# Patient Record
Sex: MALE | Race: WHITE | NOT HISPANIC OR LATINO | Employment: OTHER | URBAN - METROPOLITAN AREA
[De-identification: names, ages, dates, MRNs, and addresses within clinical notes are randomized per-mention and may not be internally consistent; named-entity substitution may affect disease eponyms.]

---

## 2022-12-12 ENCOUNTER — APPOINTMENT (OUTPATIENT)
Dept: RADIOLOGY | Facility: CLINIC | Age: 82
End: 2022-12-12

## 2022-12-12 VITALS
RESPIRATION RATE: 18 BRPM | OXYGEN SATURATION: 98 % | DIASTOLIC BLOOD PRESSURE: 68 MMHG | HEART RATE: 70 BPM | WEIGHT: 204 LBS | BODY MASS INDEX: 32.02 KG/M2 | SYSTOLIC BLOOD PRESSURE: 112 MMHG | HEIGHT: 67 IN

## 2022-12-12 DIAGNOSIS — M25.571 RIGHT ANKLE PAIN, UNSPECIFIED CHRONICITY: ICD-10-CM

## 2022-12-12 DIAGNOSIS — M19.071 OSTEOARTHRITIS OF RIGHT ANKLE, UNSPECIFIED OSTEOARTHRITIS TYPE: Primary | ICD-10-CM

## 2022-12-12 RX ORDER — TRIAMCINOLONE ACETONIDE 40 MG/ML
40 INJECTION, SUSPENSION INTRA-ARTICULAR; INTRAMUSCULAR
Status: COMPLETED | OUTPATIENT
Start: 2022-12-12 | End: 2022-12-12

## 2022-12-12 RX ORDER — LIDOCAINE HYDROCHLORIDE 10 MG/ML
1 INJECTION, SOLUTION INFILTRATION; PERINEURAL
Status: COMPLETED | OUTPATIENT
Start: 2022-12-12 | End: 2022-12-12

## 2022-12-12 RX ADMIN — LIDOCAINE HYDROCHLORIDE 1 ML: 10 INJECTION, SOLUTION INFILTRATION; PERINEURAL at 12:11

## 2022-12-12 RX ADMIN — TRIAMCINOLONE ACETONIDE 40 MG: 40 INJECTION, SUSPENSION INTRA-ARTICULAR; INTRAMUSCULAR at 12:11

## 2022-12-12 NOTE — PROGRESS NOTES
Subjective:     Chief Complaint   Patient presents with   • Right Ankle - Clicking, Swelling, Pain, Numbness     Owen Mcconnell is a 78 y o  male complains of right ankle pain  Onset of the symptoms was several years  Mechanism of injury: none  Aggravating factors: walking , weight bearing and at rest  Treatment to date: corticosteroid injection which was somewhat effective and OTC analgesics which are not very effective  Symptoms have gradually worsened  Patient reports that he has been dealing with right ankle osteoarthritis for the past few years  Has seen several doctors to [address] [place] [address] and has received corticosteroid injections which provide him temporary relief  Most recent injection was performed 4 months ago  Additionally reports that he had the microfracture surgery which gave him temporary relief  Was advised that fusion was an option however patient was reluctant as there is no guarantee in pain improvement with procedure  The following portions of the patient's history were reviewed and updated as appropriate: allergies, current medications, past family history, past medical history, past social history, past surgical history and problem list        Review of Systems   Constitutional: Negative for fever  Musculoskeletal: positive for ankle pain and difficulty walking  Objective:  /68   Pulse 70   Resp 18   Ht 5' 7" (1 702 m)   Wt 92 5 kg (204 lb)   SpO2 98%   BMI 31 95 kg/m²      Skin: Venous stasis edema, no open wounds or erythema, no signs of infection  Neurologic: Neurologic exam is normal throughout lower extremities, Awake, alert, and oriented x3, no apparent distress  Musculoskeletal:  Inspection: Notable edema in the ankle and leg  Limited motion with ankle dorsiflexion and plantarflexion eversion and inversion  Notable crepitus with ankle motion  Pain in the tibiotalar joint               Imaging:     See final report      Assessment/Plan:  1  Osteoarthritis of right ankle, unspecified osteoarthritis type  - XR ankle 3+ vw right; Future        > 45 min devoted to review of previous, pertinent medical records, imaging, discussion of treatment options, counseling and documentation  Imaging independently reviewed and discussed with patient  Severe degenerative changes noted, no acute fractures appreciated  Follow-up official reading  We discussed the nature of ankle OA at length and detailed the treatment approach  Discussed role for CSI-given his mild relief with prior injection he would like to trial CSI in office  Peformed without complication  Directed to ice the area daily for 20 minutes at a time using a barrier to protect the skin- stressed specifically icing after activity to address inflammation  Recommending formal PT- patient declines after counseling  Discussed with patient that given his degree of arthritis in the ankle treatment options include surgical nonsurgical options  Advised that CSI's are appropriate for symptom management however may stop giving him relief given his degree of arthritis  In that situation I would recommend further consultation with podiatry to see if patient would be a candidate for possible surgical intervention  At this point patient would like to continue with conservative treatment options  Medium joint arthrocentesis: R ankle  Universal Protocol:  Consent: Verbal consent obtained  Risks and benefits: risks, benefits and alternatives were discussed  Consent given by: patient    Supporting Documentation  Indications: pain   Procedure Details  Location: ankle - R ankle  Needle size: 22 G  Ultrasound guidance: no  Approach: anteromedial  Medications administered: 1 mL lidocaine 1 %; 40 mg triamcinolone acetonide 40 mg/mL    Patient tolerance: patient tolerated the procedure well with no immediate complications    Risks and benefits of CSI were discussed with patient extensively   Risks were highlighted which included but were not limited to infection, pain, local site swelling, and chance that injection may not be effective  Patient was also counseled regarding glucose elevation days after receiving CSI and to be mindful of diet and check sugars daily  Patient agreeable to proceed with CSI after counseling

## 2023-01-09 ENCOUNTER — OFFICE VISIT (OUTPATIENT)
Dept: OBGYN CLINIC | Facility: CLINIC | Age: 83
End: 2023-01-09

## 2023-01-09 VITALS
HEART RATE: 86 BPM | WEIGHT: 205 LBS | SYSTOLIC BLOOD PRESSURE: 128 MMHG | RESPIRATION RATE: 18 BRPM | BODY MASS INDEX: 32.18 KG/M2 | DIASTOLIC BLOOD PRESSURE: 78 MMHG | OXYGEN SATURATION: 96 % | HEIGHT: 67 IN

## 2023-01-09 DIAGNOSIS — M19.071 OSTEOARTHRITIS OF RIGHT ANKLE, UNSPECIFIED OSTEOARTHRITIS TYPE: Primary | ICD-10-CM

## 2023-01-09 NOTE — PROGRESS NOTES
Subjective:     Chief Complaint   Patient presents with   • Right Ankle - Clicking, Swelling, Numbness, Tingling, Pain, Follow-up     Rodolfo Rojas is a 78 y o  male complains of right ankle pain  Onset of the symptoms was several years  Mechanism of injury: none  Aggravating factors: walking , weight bearing and at rest  Treatment to date: corticosteroid injection which was somewhat effective and OTC analgesics which are not very effective  Symptoms have gradually worsened  Patient presenting for follow-up visit in regards to right ankle pain  Patient was provided with corticosteroid injection into the right tibiotalar joint under ultrasound guidance  States that the injection did provide him relief for approximately 1 month however now has started to worsen in terms of pain severity  Is looking to have repeat injection in office today  Last injection was performed approximately 5 weeks ago    The following portions of the patient's history were reviewed and updated as appropriate: allergies, current medications, past family history, past medical history, past social history, past surgical history and problem list        Review of Systems   Constitutional: Negative for fever  Musculoskeletal: positive for ankle pain and difficulty walking  Objective:  /78   Pulse 86   Resp 18   Ht 5' 7" (1 702 m)   Wt 93 kg (205 lb)   SpO2 96%   BMI 32 11 kg/m²      Skin: Venous stasis edema, no open wounds or erythema, no signs of infection  Neurologic: Neurologic exam is normal throughout lower extremities, Awake, alert, and oriented x3, no apparent distress  Musculoskeletal:  Inspection: Notable edema in the ankle and leg  Limited motion with ankle dorsiflexion and plantarflexion eversion and inversion  Notable crepitus with ankle motion  Pain in the tibiotalar joint               Imaging:     See final report      Assessment/Plan:  1   Osteoarthritis of right ankle, unspecified osteoarthritis type    Patient has severe degree of osteoarthritis affecting the right ankle  Has received corticosteroid injections in the past from outside facility and I was able to perform ultrasound-guided steroid injection at last visit approximately 5 weeks ago which gave patient temporary relief  I did advise patient that at this time he would not be eligible for repeat injection as it is only been 5 weeks  I am recommending that given minimal pain relief with injection is next recommended step in terms of management would be consultation with podiatry to assess and see if alternative footwear versus surgical intervention may be indicated  Transportation seems to be an issue for patient so I advised that he can follow-up in [facility] which is closer to his residence  External referral for podiatry placed  Additionally patient seems to be hesitant on surgical intervention, I did advise that he may want to discuss possible pain management referral with primary care doctor or podiatry if surgery is not desired after consultation

## 2023-11-21 ENCOUNTER — OFFICE VISIT (OUTPATIENT)
Dept: OBGYN CLINIC | Facility: CLINIC | Age: 83
End: 2023-11-21
Payer: COMMERCIAL

## 2023-11-21 VITALS
HEART RATE: 79 BPM | RESPIRATION RATE: 18 BRPM | BODY MASS INDEX: 31.08 KG/M2 | SYSTOLIC BLOOD PRESSURE: 126 MMHG | WEIGHT: 198 LBS | HEIGHT: 67 IN | DIASTOLIC BLOOD PRESSURE: 72 MMHG | OXYGEN SATURATION: 96 %

## 2023-11-21 DIAGNOSIS — M19.071 OSTEOARTHRITIS OF RIGHT ANKLE, UNSPECIFIED OSTEOARTHRITIS TYPE: Primary | ICD-10-CM

## 2023-11-21 PROCEDURE — 99213 OFFICE O/P EST LOW 20 MIN: CPT | Performed by: FAMILY MEDICINE

## 2023-11-21 PROCEDURE — 20606 DRAIN/INJ JOINT/BURSA W/US: CPT | Performed by: FAMILY MEDICINE

## 2023-11-21 RX ORDER — TRIAMCINOLONE ACETONIDE 40 MG/ML
40 INJECTION, SUSPENSION INTRA-ARTICULAR; INTRAMUSCULAR
Status: COMPLETED | OUTPATIENT
Start: 2023-11-21 | End: 2023-11-21

## 2023-11-21 RX ORDER — BUPIVACAINE HYDROCHLORIDE 2.5 MG/ML
2 INJECTION, SOLUTION INFILTRATION; PERINEURAL
Status: COMPLETED | OUTPATIENT
Start: 2023-11-21 | End: 2023-11-21

## 2023-11-21 RX ADMIN — BUPIVACAINE HYDROCHLORIDE 2 ML: 2.5 INJECTION, SOLUTION INFILTRATION; PERINEURAL at 15:30

## 2023-11-21 RX ADMIN — TRIAMCINOLONE ACETONIDE 40 MG: 40 INJECTION, SUSPENSION INTRA-ARTICULAR; INTRAMUSCULAR at 15:30

## 2023-11-21 NOTE — PROGRESS NOTES
Medium joint arthrocentesis: R ankle  Universal Protocol:  Consent: Verbal consent obtained. Risks and benefits: risks, benefits and alternatives were discussed  Consent given by: patient  Supporting Documentation  Indications: pain   Procedure Details  Location: ankle - R ankle  Preparation: Patient was prepped and draped in the usual sterile fashion  Needle size: 25 G  Ultrasound guidance: yes  Approach: anteromedial  Medications administered: 2 mL bupivacaine 0.25 %; 40 mg triamcinolone acetonide 40 mg/mL    Patient tolerance: patient tolerated the procedure well with no immediate complications    Risks and benefits of CSI were discussed with patient extensively. Risks were highlighted which included but were not limited to infection, pain, local site swelling, and chance that injection may not be effective. Patient was also counseled regarding glucose elevation days after receiving CSI and to be mindful of diet and check sugars daily. Patient agreeable to proceed with CSI after counseling.      US video clip was saved to the [employer] machine

## 2023-11-21 NOTE — PROGRESS NOTES
Subjective:     Chief Complaint   Patient presents with    Right Ankle - Pain, Follow-up, Swelling     Mir Billingsley is a 78 y.o. male is presenting for follow-up visit in regards to right ankle pain. Patient does have known history of osteoarthritis of the right ankle which has been treated in the past with a ultrasound-guided tibiotalar joint injection. The injection had only lasted about 1 month of relief and he did have recurrence of symptoms after that point. He was referred at last visit to a foot and ankle specialist in [place]. He states that the foot and ankle physician had recommended massage therapy and NIKKI stockings for edema. He has had longstanding issues with edema on the right lower extremity which has been evaluated with multiple venous Dopplers ABIs all of which have been unremarkable unremarkable to his report        The following portions of the patient's history were reviewed and updated as appropriate: allergies, current medications, past family history, past medical history, past social history, past surgical history and problem list.       Review of Systems   Constitutional: Negative for fever. Musculoskeletal: positive for ankle pain and difficulty walking. Objective:  /72   Pulse 79   Resp 18   Ht 5' 7" (1.702 m)   Wt 89.8 kg (198 lb)   SpO2 96%   BMI 31.01 kg/m²      Skin: Venous stasis edema, no open wounds or erythema, no signs of infection  Neurologic: Neurologic exam is normal throughout lower extremities, Awake, alert, and oriented x3, no apparent distress. Musculoskeletal:  Inspection: Notable edema in the ankle and leg  Limited motion with ankle dorsiflexion and plantarflexion eversion and inversion  Notable crepitus with ankle motion  Pain in the tibiotalar joint               Imaging:     See final report      Assessment/Plan:  1.  Osteoarthritis of right ankle, unspecified osteoarthritis type    Again was discussed with the patient in regards sean osteoarthritis of the right ankle. Patient has severe degeneration in the tibiotalar joint which is limited his motion of the ankle and has caused a bit of subluxation of the talus and relative to the tibia. I had referred him to foot and ankle specialist in [place] in the past however patient states that he was seen by a podiatrist who recommended massage therapy for underlying edema. We discussed repeating a corticosteroid injection for pain relief which does give him temporary symptom relief however I would like him to follow-up with a foot and ankle specialist within our network for further recommendations and determine if surgical intervention may be a good option for patient to consider.   Ultrasound-guided corticosteroid injection was provided in office today without complication

## 2023-11-29 ENCOUNTER — OFFICE VISIT (OUTPATIENT)
Dept: OBGYN CLINIC | Facility: CLINIC | Age: 83
End: 2023-11-29
Payer: COMMERCIAL

## 2023-11-29 ENCOUNTER — APPOINTMENT (OUTPATIENT)
Dept: RADIOLOGY | Facility: AMBULARY SURGERY CENTER | Age: 83
End: 2023-11-29
Attending: ORTHOPAEDIC SURGERY
Payer: COMMERCIAL

## 2023-11-29 VITALS
DIASTOLIC BLOOD PRESSURE: 62 MMHG | WEIGHT: 198 LBS | HEIGHT: 67 IN | HEART RATE: 80 BPM | BODY MASS INDEX: 31.08 KG/M2 | SYSTOLIC BLOOD PRESSURE: 170 MMHG

## 2023-11-29 DIAGNOSIS — M19.071 OSTEOARTHRITIS OF RIGHT ANKLE, UNSPECIFIED OSTEOARTHRITIS TYPE: ICD-10-CM

## 2023-11-29 DIAGNOSIS — M19.171 POST-TRAUMATIC ARTHRITIS OF RIGHT ANKLE: Primary | ICD-10-CM

## 2023-11-29 DIAGNOSIS — Q66.6 CONGENITAL HINDFOOT VALGUS: ICD-10-CM

## 2023-11-29 PROCEDURE — 73600 X-RAY EXAM OF ANKLE: CPT

## 2023-11-29 PROCEDURE — 73610 X-RAY EXAM OF ANKLE: CPT

## 2023-11-29 PROCEDURE — 99213 OFFICE O/P EST LOW 20 MIN: CPT | Performed by: ORTHOPAEDIC SURGERY

## 2023-11-29 NOTE — PATIENT INSTRUCTIONS
[place], [employer], [employer] are good brands but I recommend going to a dedicate shoe store (not [employer] or [employer].) At these types of stores, they have experts that can fit you for shoes appropriate for your foot problem. Shoe choice is essential to solving/improving most types of foot pain. Even after a surgery, good shoes are necessary to keep the foot as comfortable as possible. [employer]  [address] [address]  [address] [facility]  [phone]    [facility] [address] [facility] [place]  [address] [facility]. 100 [facility], [facility] [place]    [employer]  [address] [facility], [address] [facility]  [phone]    [facility]  [address] [facility] [place], [address] [facility]   [address] [facility], [address]  [phone]    [employer]  [address] [facility], [address]  [address] [facility] [place]  [address] [facility], [address]   [phone]

## 2023-11-29 NOTE — PROGRESS NOTES
Darvin Lisa M.D. Attending, Orthopaedic Surgery  Foot and [address] [facility] [place]        ORTHOPAEDIC FOOT AND ANKLE CLINIC VISIT     Assessment:     Encounter Diagnoses   Name Primary? Osteoarthritis of right ankle, unspecified osteoarthritis type     Post-traumatic arthritis of right ankle Yes              Plan:   The patient verbalized understanding of exam findings and treatment plan. We engaged in the shared decision-making process and treatment options were discussed at length with the patient. Surgical and conservative management discussed today along with risks and benefits. He has end-stage post-traumatic right ankle arthritis that has been bothering him for years. He uses NSAIDs as needed and has received an injection into this ankle last week with Dr. Baljit Schrader. We discussed an [place] brace. At his age, an [address] tends to be tolerated very well and does alleviate significant pain related to his problem. We ordered him this brace. Elevation and ice at the end of any day where he overuses his ankle. Follow up in 3 months to evaluate the efficacy of the brace. History of Present Illness:   Chief Complaint:   Chief Complaint   Patient presents with    Right Ankle - Pain     Patient has had right ankle pain for years and is unable to figure out what is wrong with it      Catarina Jacome is a 78 y.o. male who is being seen for right ankle arthritis. He has had pain in this ankle for years. Pain is localized at anterior ankle in a band like distribution with minimal radiating and described as sharp and severe. Patient denies numbness, tingling or radicular pain. Denies history of neuropathy. Patient does not smoke, does not have diabetes and does not take blood thinners. Patient denies family history of anesthesia complications and has not had any complications with anesthesia.      Pain/symptom timing:  Worse during the day when active  Pain/symptom context:  Worse with activites and work  Pain/symptom modifying factors:  Rest makes better, activities make worse  Pain/symptom associated signs/symptoms: none    Prior treatment   NSAIDsYes    Injections No   Bracing/Orthotics No   Physical Therapy No     Orthopedic Surgical History:   See below    Past Medical, Surgical and Social History:  Past Medical History:  has a past medical history of Cancer ([address]), Heart disease, HL (hearing loss), Hypertension, and Osteoarthritis ([place]). Problem List: does not have a problem list on file. Past Surgical History:  has a past surgical history that includes Tonsillectomy and Ankle surgery. Family History: family history includes No Known Problems in his father and mother. Social History:  reports that he has never smoked. He has never used smokeless tobacco. He reports current alcohol use of about 2.0 standard drinks of alcohol per week. He reports that he does not use drugs. Current Medications: has a current medication list which includes the following prescription(s): allopurinol, aspirin, levothyroxine, ropinirole, simvastatin, spironolactone, and metoprolol succinate. Allergies: has No Known Allergies. Review of Systems:  General- denies fever/chills  HEENT- denies hearing loss or sore throat  Eyes- denies eye pain or visual disturbances, denies red eyes  Respiratory- denies cough or SOB  Cardio- denies chest pain or palpitations  GI- denies abdominal pain  Endocrine- denies urinary frequency  Urinary- denies pain with urination  Musculoskeletal- Negative except noted above  Skin- denies rashes or wounds  Neurological- denies dizziness or headache  Psychiatric- denies anxiety or difficulty concentrating    Physical Exam:   /62   Pulse 80   Ht 5' 7" (1.702 m)   Wt 89.8 kg (198 lb)   BMI 31.01 kg/m²   General/Constitutional: No apparent distress: well-nourished and well developed.   Eyes: normal ocular motion  Cardio: RRR, Normal S1S2, No m/r/g  Lymphatic: No appreciable lymphadenopathy  Respiratory: Non-labored breathing, CTA b/l no w/c/r  Vascular: No edema, swelling or tenderness, except as noted in detailed exam.  Integumentary: No impressive skin lesions present, except as noted in detailed exam.  Neuro: No ataxia or tremors noted  Psych: Normal mood and affect, oriented to person, place and time. Appropriate affect. Musculoskeletal: Normal, except as noted in detailed exam and in HPI. Examination    Right    Gait Antalgic   Musculoskeletal Tender to palpation at anterior ankle    Skin Normal.      Nails Normal    Range of Motion  0 degrees dorsiflexion, 10 degrees plantarflexion  Subtalar motion: 10i, 10e    Stability Stable    Muscle Strength 5/5 tibialis anterior  5/5 gastrocnemius-soleus  5/5 posterior tibialis  5/5 peroneal/eversion strength  5/5 EHL  5/5 FHL    Neurologic Normal    Sensation  Intact to light touch throughout sural, saphenous, superficial peroneal, deep peroneal and medial/lateral plantar nerve distributions. Cambridge-Javan 5.07 filament (10g) testing deferred. Cardiovascular Brisk capillary refill < 2 seconds,intact DP and PT pulses    Special Tests None      Imaging Studies:   6 views of the right ankle were taken, reviewed and interpreted independently that demonstrate severe, end-stage ankle arthritis. Reviewed by me personally. Stella Young. Lachman, MD  Foot & Ankle Surgery   Department of [address] [place] [address] personally performed the service. Stella Young.  Lachman, MD

## 2024-04-01 ENCOUNTER — OFFICE VISIT (OUTPATIENT)
Dept: OBGYN CLINIC | Facility: CLINIC | Age: 84
End: 2024-04-01
Payer: COMMERCIAL

## 2024-04-01 VITALS — WEIGHT: 218 LBS | HEIGHT: 67 IN | OXYGEN SATURATION: 98 % | RESPIRATION RATE: 18 BRPM | BODY MASS INDEX: 34.21 KG/M2

## 2024-04-01 DIAGNOSIS — M19.071 OSTEOARTHRITIS OF RIGHT ANKLE, UNSPECIFIED OSTEOARTHRITIS TYPE: Primary | ICD-10-CM

## 2024-04-01 DIAGNOSIS — I89.0 LYMPHEDEMA: ICD-10-CM

## 2024-04-01 PROCEDURE — 20606 DRAIN/INJ JOINT/BURSA W/US: CPT | Performed by: FAMILY MEDICINE

## 2024-04-01 PROCEDURE — 99213 OFFICE O/P EST LOW 20 MIN: CPT | Performed by: FAMILY MEDICINE

## 2024-04-01 RX ORDER — TRIAMCINOLONE ACETONIDE 40 MG/ML
40 INJECTION, SUSPENSION INTRA-ARTICULAR; INTRAMUSCULAR
Status: COMPLETED | OUTPATIENT
Start: 2024-04-01 | End: 2024-04-01

## 2024-04-01 RX ORDER — BUPIVACAINE HYDROCHLORIDE 2.5 MG/ML
2 INJECTION, SOLUTION INFILTRATION; PERINEURAL
Status: COMPLETED | OUTPATIENT
Start: 2024-04-01 | End: 2024-04-01

## 2024-04-01 RX ADMIN — BUPIVACAINE HYDROCHLORIDE 2 ML: 2.5 INJECTION, SOLUTION INFILTRATION; PERINEURAL at 10:00

## 2024-04-01 RX ADMIN — TRIAMCINOLONE ACETONIDE 40 MG: 40 INJECTION, SUSPENSION INTRA-ARTICULAR; INTRAMUSCULAR at 10:00

## 2024-04-01 NOTE — PROGRESS NOTES
"    Subjective:     Chief Complaint   Patient presents with    Right Ankle - Clicking, Swelling, Pain, Numbness, Tingling, Follow-up     Yunior Holloway is a 83 y.o. male presenting for a follow-up visit in regards to chronic right ankle pain and swelling.  Patient has been treated in the past with corticosteroid injection under ultrasound guidance for the right tibiotalar joint.  He does report temporary pain relief with corticosteroid injection therapy however pain duration relief is short-lived lasting only about 1 to 2 months.  She has seen foot and ankle surgery who recommended continued conservative treatment.  He also unfortunately has been suffering from pretty severe lymphedema and has seen podiatry and lymphedema specialist.  Unfortunately has not had much relief with lymphedema therapy        The following portions of the patient's history were reviewed and updated as appropriate: allergies, current medications, past family history, past medical history, past social history, past surgical history and problem list.       Review of Systems   Constitutional: Negative for fever.   Musculoskeletal: positive for ankle pain and difficulty walking.      Objective:  Resp 18   Ht 5' 7\" (1.702 m)   Wt 98.9 kg (218 lb)   SpO2 98%   BMI 34.14 kg/m²      Skin: Venous stasis edema, no open wounds or erythema, no signs of infection  Neurologic: Neurologic exam is normal throughout lower extremities, Awake, alert, and oriented x3, no apparent distress.   Musculoskeletal:  Inspection: Notable edema in the ankle and leg  Limited motion with ankle dorsiflexion and plantarflexion eversion and inversion  Notable crepitus with ankle motion  Pain in the tibiotalar joint               Imaging:     See final report      Assessment/Plan:  1. Osteoarthritis of right ankle, unspecified osteoarthritis type    - Medium joint arthrocentesis: R ankle    2. Lymphedema    83-year-old male patient presenting today for a follow-up visit in " regards to right ankle osteoarthritis.  He had consulted with foot and ankle specialist who recommended continued conservative treatment.  We discussed repeating corticosteroid injection as it does give him temporary relief.  After discussion patient is agreeable and CSI was performed in office today without complication.  Unfortunately patient has additionally been suffering from pretty severe longstanding lymphedema and has been persisting despite lymphedema therapy.  I did provide him a contact information for lymphedema clinic at her [facility]'s site to see if this may help alleviate some of his swelling and edema symptoms.

## 2024-04-01 NOTE — PROGRESS NOTES
Medium joint arthrocentesis: R ankle  Universal Protocol:  Consent: Verbal consent obtained.  Risks and benefits: risks, benefits and alternatives were discussed  Consent given by: patient  Patient identity confirmed: verbally with patient  Supporting Documentation  Indications: pain   Procedure Details  Location: ankle - R ankle  Preparation: Patient was prepped and draped in the usual sterile fashion  Needle size: 25 G  Ultrasound guidance: yes  Approach: anteromedial  Medications administered: 2 mL bupivacaine 0.25 %; 40 mg triamcinolone acetonide 40 mg/mL    Patient tolerance: patient tolerated the procedure well with no immediate complications    Risks and benefits of CSI were discussed with patient extensively. Risks were highlighted which included but were not limited to infection, pain, local site swelling, and chance that injection may not be effective. Patient was also counseled regarding glucose elevation days after receiving CSI and to be mindful of diet and check sugars daily. Patient agreeable to proceed with CSI after counseling.     US images saved to the [employer] machine

## 2024-06-19 ENCOUNTER — EVALUATION (OUTPATIENT)
Dept: PHYSICAL THERAPY | Facility: CLINIC | Age: 84
End: 2024-06-19
Payer: COMMERCIAL

## 2024-06-19 DIAGNOSIS — M19.079 OSTEOARTHRITIS OF FOOT, UNSPECIFIED LATERALITY, UNSPECIFIED OSTEOARTHRITIS TYPE: ICD-10-CM

## 2024-06-19 DIAGNOSIS — I89.0 LYMPHEDEMA: Primary | ICD-10-CM

## 2024-06-19 PROCEDURE — 97161 PT EVAL LOW COMPLEX 20 MIN: CPT | Performed by: PHYSICAL THERAPIST

## 2024-06-19 PROCEDURE — 97110 THERAPEUTIC EXERCISES: CPT | Performed by: PHYSICAL THERAPIST

## 2024-06-19 NOTE — LETTER
2024    Jose Costa DPM  [address] [facility] [address]    Patient: Yunior Holloway   YOB: 1940   Date of Visit: 2024     Encounter Diagnosis     ICD-10-CM    1. Lymphedema  I89.0       2. Osteoarthritis of foot, unspecified laterality, unspecified osteoarthritis type  M19.079           Dear Dr. Costa:    Thank you for your recent referral of Yunior Holloway. Please review the attached evaluation summary from Yunior's recent visit.     Please verify that you agree with the plan of care by signing the attached order.     If you have any questions or concerns, please do not hesitate to call.     I sincerely appreciate the opportunity to share in the care of one of your patients and hope to have another opportunity to work with you in the near future.       Sincerely,    Lasha Martinez, PT      Referring Provider:      I certify that I have read the below Plan of Care and certify the need for these services furnished under this plan of treatment while under my care.                    Jose Costa DPM  [address] [facility] [address]  [facility] [facility]          PT Evaluation     Today's date: 2024  Patient name: Yunior Holloway  : 1940  MRN: [id number]  Referring provider: Jose Costa DPM  Dx:   Encounter Diagnosis     ICD-10-CM    1. Lymphedema  I89.0       2. Osteoarthritis of foot, unspecified laterality, unspecified osteoarthritis type  M19.079                      Assessment  Impairments: lacks appropriate home exercise program  Other impairment: LE lymphedema    Assessment details: Yunior Holloway is an 84 y.o. male presenting as an outpatient to [facility] PT w/ c/o b/l LE lymphedema. Pt will benefit from skilled PT from skilled PT services in order to max function to allow pt to achieve goals in PT. Thank you.    Understanding of Dx/Px/POC: good     Prognosis: good    Goals  ST.Edema decreased by 2-5 cm in 4 weeks.   2. Pt will be independent w/ initial  HEP in 1-2 visits.     LT. Edema decreased by 5-10 cm by d/c.   2. Pt will be independent w/ comprehensive HEP by d/c.     Plan  Other planned modality interventions: other modalities PRN    Planned therapy interventions: ADL retraining, manual therapy, patient education, therapeutic exercise, therapeutic activities, balance, massage and home exercise program  Other planned therapy interventions: other interventions PRN    Frequency: 1-2x/week.  Duration in weeks: 8  Plan of Care beginning date: 2024  Plan of Care expiration date: 2024  Treatment plan discussed with: patient      Subjective Evaluation    History of Present Illness  Mechanism of injury: Pt reports to IE w/ c/o b/l LE lymphedema (R worse vs L) which began several years ago of insidious onset w/ progressive worsening over that time.  Pt unsure of the cause.     Pt already uses home compression pump 2x/day- sees only very temporary relief. Pt has been wearing compression socks for the past approx 2 years- has not noticed much difference w/ that.     Pt reports that his feet always feel like he has shoes on.     Pt denies any hx of DVT.     Pt w/ hx of severe OA in R ankle. He is not interested in tx for ankle as he has had tx before that makes pain worse.    Patient Goals  Patient goals for therapy: decreased edema    Pain  No pain reported  Alleviating factors: elevation (helps minimally), compression.  Exacerbated by: gravity dependent position.    Social Support  Stairs in house: no   Lives in: one-story house  Lives with: alone    Employment status: not working (Pt is retired)      Objective     General Comments:      Ankle/Foot Comments   Observation: Some healing small lesions along lower legs, but no s/s of infection/drainage    Edema in b/l LE w/ circumferential girth measurements as below (L/R):   Knee: 43 cm /43.5 cm  30 cm proximal to ankle: 37.5 cm/39 cm  20 cm proximal to ankle:  38 cm/46 cm  10 cm proximal to ankle: 33 cm  /40.5 cm  Ankle: 31.5 cm/37 cm  Midfoot: 29.5 cm/33 cm  MTP: 26.5 cm/30 cm    Palpation: Pitting edema in b/l feet and lower leg; no pain w/ palpation to to b/l LE    Strength (L/R):  Hip Flexion: 4/4+  Knee Flexion: 4+/4+   Knee Extension: 4+/4+  Ankle DF: L 5  DNT R DUE TO SEVERE R ANKLE OA  Ankle PF: L 5 DNT R DUE TO SEVERE R ANKLE OA           Daily Treatment Diary      Precautions: Hx of skin CA; hx of gout; HTN- takes meds; Stent in heart; R ankle OA  Co- Morbidities:   Patient Active Problem List   Diagnosis   • Post-traumatic arthritis of right ankle   • Congenital hindfoot valgus         Manual  6/19                     MLD                                                                                                                        Exercise Diary 6/19                     THEREX             Pt Ed RB- HEP instruct/handout and education on compression, elevation                         Recumbent Bike                       Gastrocs Stretch                       LAQ                       HS curls             Marches             Active HS stretch w/ ankle pumps             Ankle pumps                                       NEURO RE-ED                                                                                                                                                                                             Modalities

## 2024-06-19 NOTE — PROGRESS NOTES
PT Evaluation     Today's date: 2024  Patient name: Yunior Holloway  : 1940  MRN: [id number]  Referring provider: Jose Costa DPM  Dx:   Encounter Diagnosis     ICD-10-CM    1. Lymphedema  I89.0       2. Osteoarthritis of foot, unspecified laterality, unspecified osteoarthritis type  M19.079                      Assessment  Impairments: lacks appropriate home exercise program  Other impairment: LE lymphedema    Assessment details: Yunior Holloway is an 84 y.o. male presenting as an outpatient to [facility] PT w/ c/o b/l LE lymphedema. Pt will benefit from skilled PT from skilled PT services in order to max function to allow pt to achieve goals in PT. Thank you.    Understanding of Dx/Px/POC: good     Prognosis: good    Goals  ST.Edema decreased by 2-5 cm in 4 weeks.   2. Pt will be independent w/ initial HEP in 1-2 visits.     LT. Edema decreased by 5-10 cm by d/c.   2. Pt will be independent w/ comprehensive HEP by d/c.     Plan  Other planned modality interventions: other modalities PRN    Planned therapy interventions: ADL retraining, manual therapy, patient education, therapeutic exercise, therapeutic activities, balance, massage and home exercise program  Other planned therapy interventions: other interventions PRN    Frequency: 1-2x/week.  Duration in weeks: 8  Plan of Care beginning date: 2024  Plan of Care expiration date: 2024  Treatment plan discussed with: patient      Subjective Evaluation    History of Present Illness  Mechanism of injury: Pt reports to IE w/ c/o b/l LE lymphedema (R worse vs L) which began several years ago of insidious onset w/ progressive worsening over that time.  Pt unsure of the cause.     Pt already uses home compression pump 2x/day- sees only very temporary relief. Pt has been wearing compression socks for the past approx 2 years- has not noticed much difference w/ that.     Pt reports that his feet always feel like he has shoes on.     Pt  denies any hx of DVT.     Pt w/ hx of severe OA in R ankle. He is not interested in tx for ankle as he has had tx before that makes pain worse.    Patient Goals  Patient goals for therapy: decreased edema    Pain  No pain reported  Alleviating factors: elevation (helps minimally), compression.  Exacerbated by: gravity dependent position.    Social Support  Stairs in house: no   Lives in: one-story house  Lives with: alone    Employment status: not working (Pt is retired)      Objective     General Comments:      Ankle/Foot Comments   Observation: Some healing small lesions along lower legs, but no s/s of infection/drainage    Edema in b/l LE w/ circumferential girth measurements as below (L/R):   Knee: 43 cm /43.5 cm  30 cm proximal to ankle: 37.5 cm/39 cm  20 cm proximal to ankle:  38 cm/46 cm  10 cm proximal to ankle: 33 cm /40.5 cm  Ankle: 31.5 cm/37 cm  Midfoot: 29.5 cm/33 cm  MTP: 26.5 cm/30 cm    Palpation: Pitting edema in b/l feet and lower leg; no pain w/ palpation to to b/l LE    Strength (L/R):  Hip Flexion: 4/4+  Knee Flexion: 4+/4+   Knee Extension: 4+/4+  Ankle DF: L 5  DNT R DUE TO SEVERE R ANKLE OA  Ankle PF: L 5 DNT R DUE TO SEVERE R ANKLE OA           Daily Treatment Diary      Precautions: Hx of skin CA; hx of gout; HTN- takes meds; Stent in heart; R ankle OA  Co- Morbidities:   Patient Active Problem List   Diagnosis   • Post-traumatic arthritis of right ankle   • Congenital hindfoot valgus         Manual  6/19                     MLD                                                                                                                        Exercise Diary 6/19                     THEREX             Pt Ed RB- HEP instruct/handout and education on compression, elevation                         Recumbent Bike                       Gastrocs Stretch                       LAQ                       HS curls             Marches             Active HS stretch w/ ankle pumps             Ankle  pumps                                       NEURO RE-ED                                                                                                                                                                                             Modalities

## 2024-07-01 ENCOUNTER — OFFICE VISIT (OUTPATIENT)
Dept: PHYSICAL THERAPY | Facility: CLINIC | Age: 84
End: 2024-07-01
Payer: COMMERCIAL

## 2024-07-01 DIAGNOSIS — I89.0 LYMPHEDEMA: Primary | ICD-10-CM

## 2024-07-01 PROCEDURE — 97140 MANUAL THERAPY 1/> REGIONS: CPT

## 2024-07-01 PROCEDURE — 97110 THERAPEUTIC EXERCISES: CPT

## 2024-07-01 NOTE — PROGRESS NOTES
Daily Note     Today's date: 2024  Patient name: Yunior Holloway  : 1940  MRN: [id number]  Referring provider: Jose Costa DPM  Dx:   Encounter Diagnosis     ICD-10-CM    1. Lymphedema  I89.0                      Subjective: pt reports R greater than L side.       Objective: See treatment diary below      Assessment: Tolerated treatment well. Patient would benefit from continued PT. Discussed treatment options, prognosis, elevation, compression, and exercise. Educated pt about different compression options. Discussed the Circaid would be his best option at this time. Performed MLD to improve lymphatic flow and decrease swelling.       Plan: Continue per plan of care.      Daily Treatment Diary      Precautions: Hx of skin CA; hx of gout; HTN- takes meds; Stent in heart; R ankle OA  Co- Morbidities:   Patient Active Problem List   Diagnosis    Post-traumatic arthritis of right ankle    Congenital hindfoot valgus         Manual                     MLD    [facility]                                                                                                                    Exercise Diary                      THEREX             Pt Ed RB- HEP instruct/handout and education on compression, elevation                         Recumbent Bike                       Gastrocs Stretch                       LAQ                       HS curls             Marches             Active HS stretch w/ ankle pumps             Ankle pumps                                       NEURO RE-ED                                                                                                                                                                                             Modalities

## 2024-07-08 ENCOUNTER — OFFICE VISIT (OUTPATIENT)
Dept: PHYSICAL THERAPY | Facility: CLINIC | Age: 84
End: 2024-07-08
Payer: COMMERCIAL

## 2024-07-08 DIAGNOSIS — I89.0 LYMPHEDEMA: Primary | ICD-10-CM

## 2024-07-08 PROCEDURE — 97140 MANUAL THERAPY 1/> REGIONS: CPT

## 2024-07-08 PROCEDURE — 97110 THERAPEUTIC EXERCISES: CPT

## 2024-07-08 NOTE — PROGRESS NOTES
Daily Note     Today's date: 2024  Patient name: Yunior Holloway  : 1940  MRN: [id number]  Referring provider: Jose Costa DPM  Dx:   Encounter Diagnosis     ICD-10-CM    1. Lymphedema  I89.0                      Subjective: pt reports feeling about the same.       Objective: See treatment diary below      Assessment: Tolerated treatment well. Patient would benefit from continued PT. Performed MLD to BL LE to improve lymphatic flow and decrease swelling. Discussed compression options.       Plan: Continue per plan of care.      Daily Treatment Diary      Precautions: Hx of skin CA; hx of gout; HTN- takes meds; Stent in heart; R ankle OA  Co- Morbidities:   Patient Active Problem List   Diagnosis    Post-traumatic arthritis of right ankle    Congenital hindfoot valgus         Manual                   MLD    [facility]                                                                                                                  Exercise Diary                    THEREX             Pt Ed RB- HEP instruct/handout and education on compression, elevation [facility]                       Recumbent Bike                       Gastrocs Stretch                       LAQ                       HS curls             Marches             Active HS stretch w/ ankle pumps             Ankle pumps                                       NEURO RE-ED                                                                                                                                                                                             Modalities

## 2024-07-11 ENCOUNTER — OFFICE VISIT (OUTPATIENT)
Dept: PHYSICAL THERAPY | Facility: CLINIC | Age: 84
End: 2024-07-11
Payer: COMMERCIAL

## 2024-07-11 DIAGNOSIS — I89.0 LYMPHEDEMA: Primary | ICD-10-CM

## 2024-07-11 PROCEDURE — 97140 MANUAL THERAPY 1/> REGIONS: CPT

## 2024-07-11 NOTE — PROGRESS NOTES
Daily Note     Today's date: 2024  Patient name: Yunior Holloway  : 1940  MRN: [id number]  Referring provider: Jose Costa DPM  Dx:   Encounter Diagnosis     ICD-10-CM    1. Lymphedema  I89.0                      Subjective: Pt reports feeling about the same.       Objective: See treatment diary below      Assessment: Tolerated treatment well. Patient would benefit from continued PT. Performed MLD to improve lymphatic flow and decrease swelling. Improved fluid mobility post manual. Modified session due to pt arriving late.       Plan: Continue per plan of care.      Daily Treatment Diary      Precautions: Hx of skin CA; hx of gout; HTN- takes meds; Stent in heart; R ankle OA  Co- Morbidities:   Patient Active Problem List   Diagnosis    Post-traumatic arthritis of right ankle    Congenital hindfoot valgus         Manual                 MLD    [facility]                                                                                                                Exercise Diary                THEREX             Pt Ed RB- HEP instruct/handout and education on compression, elevation [facility]                      Recumbent Bike                       Gastrocs Stretch                       LAQ                       HS curls             Marches             Active HS stretch w/ ankle pumps             Ankle pumps                                       NEURO RE-ED                                                                                                                                                                                             Modalities

## 2024-07-15 ENCOUNTER — OFFICE VISIT (OUTPATIENT)
Dept: PHYSICAL THERAPY | Facility: CLINIC | Age: 84
End: 2024-07-15
Payer: COMMERCIAL

## 2024-07-15 DIAGNOSIS — I89.0 LYMPHEDEMA: Primary | ICD-10-CM

## 2024-07-15 PROCEDURE — 97140 MANUAL THERAPY 1/> REGIONS: CPT

## 2024-07-15 PROCEDURE — 97110 THERAPEUTIC EXERCISES: CPT

## 2024-07-15 NOTE — PROGRESS NOTES
Daily Note     Today's date: 7/15/2024  Patient name: Yuniro Holloway  : 1940  MRN: [id number]  Referring provider: Jose Costa DPM  Dx:   Encounter Diagnosis     ICD-10-CM    1. Lymphedema  I89.0                      Subjective: pt reports no major changes so far.       Objective: See treatment diary below      Assessment: Tolerated treatment well. Patient would benefit from continued PT. Decreased tightness in skin post MLD. Discussed compression options and use of compression pumps.       Plan: Continue per plan of care.      Daily Treatment Diary      Precautions: Hx of skin CA; hx of gout; HTN- takes meds; Stent in heart; R ankle OA  Co- Morbidities:   Patient Active Problem List   Diagnosis    Post-traumatic arthritis of right ankle    Congenital hindfoot valgus         Manual  6/19  7/1  7/8  7/11  7/15             MLD    [facility]                                                                                                              Exercise Diary 6/19  7/1  7/8 7/11  7/15             THEREX             Pt Ed RB- HEP instruct/handout and education on compression, elevation [facility]                     Recumbent Bike                       Gastrocs Stretch                       LAQ                       HS curls             Marches             Active HS stretch w/ ankle pumps             Ankle pumps                                       NEURO RE-ED                                                                                                                                                                                             Modalities

## 2024-07-18 ENCOUNTER — EVALUATION (OUTPATIENT)
Dept: PHYSICAL THERAPY | Facility: CLINIC | Age: 84
End: 2024-07-18
Payer: COMMERCIAL

## 2024-07-18 DIAGNOSIS — I89.0 LYMPHEDEMA: Primary | ICD-10-CM

## 2024-07-18 PROCEDURE — 97140 MANUAL THERAPY 1/> REGIONS: CPT

## 2024-07-18 PROCEDURE — 97110 THERAPEUTIC EXERCISES: CPT

## 2024-07-18 NOTE — PROGRESS NOTES
PT Re-Evaluation     Collection of Subjective/Objective data performed by Carolina Sarkar PTA. Assessment, POC, and Goal attainment performed by Barb Rodriguez DPT.       Today's date: 2024  Patient name: Yunior Holloway  : 1940  MRN: [id number]  Referring provider: Jose Costa DPM  Dx:   Encounter Diagnosis     ICD-10-CM    1. Lymphedema  I89.0                      Assessment  Impairments: lacks appropriate home exercise program  Other impairment: LE lymphedema    Assessment details: Patient is an 83 y/o male who presents to therapy with bilateral lower extremity lymphedema and has completed 6 visits to date. Patient demonstrates subjective/objective improvement since starting PT such as improved girth measurements and FOTO score. Patient will benefit from continued skilled PT intervention to address the aforementioned impairments, achieve goals, maximize function, and improve quality of life. Pt is in agreement with this plan.     Understanding of Dx/Px/POC: good     Prognosis: good    Goals  ST.Edema decreased by 2-5 cm in 4 weeks. --PROGRESSING  2. Pt will be independent w/ initial HEP in 1-2 visits. -PROGRESSING    LT. Edema decreased by 5-10 cm by d/c. -PROGRESSING  2. Pt will be independent w/ comprehensive HEP by d/c. -PROGRESSING    Plan  Other planned modality interventions: other modalities PRN    Planned therapy interventions: ADL retraining, manual therapy, patient education, therapeutic exercise, therapeutic activities, balance, massage and home exercise program  Other planned therapy interventions: other interventions PRN    Frequency: 1-2x/week.  Duration in weeks: 8  Plan of Care beginning date: 2024  Plan of Care expiration date: 2024  Treatment plan discussed with: patient      Subjective Evaluation    History of Present Illness  Mechanism of injury: RE (24):  Pt notes little to no improvement this past month. Pt stated he feels a little better post  sessions but swelling returns by the time he gets home. Pt is complaint with wearing his compression garments and using the compression pump 2x/day. Pt was ordered the Circaids but are still waiting on those to arrive.     EVAL (6/17/24):  Pt reports to IE w/ c/o b/l LE lymphedema (R worse vs L) which began several years ago of insidious onset w/ progressive worsening over that time.  Pt unsure of the cause.     Pt already uses home compression pump 2x/day- sees only very temporary relief. Pt has been wearing compression socks for the past approx 2 years- has not noticed much difference w/ that.     Pt reports that his feet always feel like he has shoes on.     Pt denies any hx of DVT.     Pt w/ hx of severe OA in R ankle. He is not interested in tx for ankle as he has had tx before that makes pain worse.    Patient Goals  Patient goals for therapy: decreased edema    Pain  No pain reported  Alleviating factors: elevation (helps minimally), compression.  Exacerbated by: gravity dependent position.    Social Support  Stairs in house: no   Lives in: one-story house  Lives with: alone    Employment status: not working (Pt is retired)      Objective     General Comments:      Ankle/Foot Comments   Observation: Some healing small lesions along lower legs, but no s/s of infection/drainage    Edema in b/l LE w/ circumferential girth measurements as below (L/R):   Knee: 41 cm /42.5 cm  30 cm proximal to ankle: 37.5 cm/39 cm  20 cm proximal to ankle:  38 cm/45.5 cm  10 cm proximal to ankle: 32 cm /39.5 cm  Ankle: 30.5 cm/36 cm  Midfoot: 28.5 cm/31 cm  MTP: 25.5 cm/28 cm    Palpation: Pitting edema in b/l feet and lower leg; no pain w/ palpation to to b/l LE    Strength (L/R):  Hip Flexion: 4/4+  Knee Flexion: 4+/4+   Knee Extension: 4+/4+  Ankle DF: L 5  DNT R DUE TO SEVERE R ANKLE OA  Ankle PF: L 5 DNT R DUE TO SEVERE R ANKLE OA    Daily Treatment Diary      Precautions: Hx of skin CA; hx of gout; HTN- takes meds; Stent in  heart; R ankle OA  Co- Morbidities:   Patient Active Problem List   Diagnosis   • Post-traumatic arthritis of right ankle   • Congenital hindfoot valgus         Manual  6/19  7/1  7/8  7/11  7/15  7/18           MLD    [facility]                                                                                                            Exercise Diary 6/19  7/1  7/8 7/11  7/15  7/18           THEREX             Pt Ed RB- HEP instruct/handout and education on compression, elevation [facility] FOTO; updated measurements                     Recumbent Bike                       Gastrocs Stretch                       LAQ                       HS curls             Marches             Active HS stretch w/ ankle pumps             Ankle pumps                                       NEURO RE-ED                                                                                                                                                                                             Modalities

## 2024-07-22 ENCOUNTER — OFFICE VISIT (OUTPATIENT)
Dept: PHYSICAL THERAPY | Facility: CLINIC | Age: 84
End: 2024-07-22
Payer: COMMERCIAL

## 2024-07-22 DIAGNOSIS — I89.0 LYMPHEDEMA: Primary | ICD-10-CM

## 2024-07-22 PROCEDURE — 97110 THERAPEUTIC EXERCISES: CPT

## 2024-07-22 PROCEDURE — 97140 MANUAL THERAPY 1/> REGIONS: CPT

## 2024-07-22 NOTE — PROGRESS NOTES
Daily Note     Today's date: 2024  Patient name: Yunior Holloway  : 1940  MRN: [id number]  Referring provider: Jose Costa DPM  Dx:   Encounter Diagnosis     ICD-10-CM    1. Lymphedema  I89.0                      Subjective: pt reports no seeing or feeling any different in the leg.       Objective: See treatment diary below      Assessment: Tolerated treatment well. Patient would benefit from continued PT. Educated about prognosis and ongoing treatment for sx. Improved skin mobility post MLD.       Plan: Continue per plan of care.      Daily Treatment Diary      Precautions: Hx of skin CA; hx of gout; HTN- takes meds; Stent in heart; R ankle OA  Co- Morbidities:   Patient Active Problem List   Diagnosis    Post-traumatic arthritis of right ankle    Congenital hindfoot valgus         Manual  6/19  7/1  7/8  7/11  7/15  7/18  7/22         MLD    [facility]                                                                                                          Exercise Diary 6/19  7/1  7/8 7/11  7/15  7/18  7/22         THEREX             Pt Ed RB- HEP instruct/handout and education on compression, elevation [facility] FOTO; updated measurements                     Recumbent Bike                       Gastrocs Stretch                       LAQ                       HS curls             Marches             Active HS stretch w/ ankle pumps             Ankle pumps                                       NEURO RE-ED                                                                                                                                                                                             Modalities

## 2024-07-25 ENCOUNTER — APPOINTMENT (OUTPATIENT)
Dept: PHYSICAL THERAPY | Facility: CLINIC | Age: 84
End: 2024-07-25
Payer: COMMERCIAL

## 2024-07-30 ENCOUNTER — APPOINTMENT (OUTPATIENT)
Dept: PHYSICAL THERAPY | Facility: CLINIC | Age: 84
End: 2024-07-30
Payer: COMMERCIAL

## 2024-10-18 ENCOUNTER — OFFICE VISIT (OUTPATIENT)
Dept: OBGYN CLINIC | Facility: CLINIC | Age: 84
End: 2024-10-18
Payer: COMMERCIAL

## 2024-10-18 VITALS
BODY MASS INDEX: 34.37 KG/M2 | RESPIRATION RATE: 18 BRPM | HEIGHT: 67 IN | HEART RATE: 96 BPM | TEMPERATURE: 97.5 F | DIASTOLIC BLOOD PRESSURE: 77 MMHG | OXYGEN SATURATION: 98 % | WEIGHT: 219 LBS | SYSTOLIC BLOOD PRESSURE: 133 MMHG

## 2024-10-18 DIAGNOSIS — M19.071 OSTEOARTHRITIS OF RIGHT ANKLE, UNSPECIFIED OSTEOARTHRITIS TYPE: Primary | ICD-10-CM

## 2024-10-18 PROCEDURE — 99213 OFFICE O/P EST LOW 20 MIN: CPT | Performed by: FAMILY MEDICINE

## 2024-10-18 PROCEDURE — 20606 DRAIN/INJ JOINT/BURSA W/US: CPT | Performed by: FAMILY MEDICINE

## 2024-10-18 RX ORDER — BUPIVACAINE HYDROCHLORIDE 2.5 MG/ML
1 INJECTION, SOLUTION INFILTRATION; PERINEURAL
Status: COMPLETED | OUTPATIENT
Start: 2024-10-18 | End: 2024-10-18

## 2024-10-18 RX ORDER — TRIAMCINOLONE ACETONIDE 40 MG/ML
40 INJECTION, SUSPENSION INTRA-ARTICULAR; INTRAMUSCULAR
Status: COMPLETED | OUTPATIENT
Start: 2024-10-18 | End: 2024-10-18

## 2024-10-18 RX ADMIN — TRIAMCINOLONE ACETONIDE 40 MG: 40 INJECTION, SUSPENSION INTRA-ARTICULAR; INTRAMUSCULAR at 15:00

## 2024-10-18 RX ADMIN — BUPIVACAINE HYDROCHLORIDE 1 ML: 2.5 INJECTION, SOLUTION INFILTRATION; PERINEURAL at 15:00

## 2024-10-18 NOTE — PROGRESS NOTES
"    Subjective:     Chief Complaint   Patient presents with    Right Ankle - Clicking, Follow-up, Swelling, Pain     Yunior Holloway is a 84 y.o. male presenting today for a 6-month follow-up visit for right ankle osteoarthritis and pain.  Patient has severe posttraumatic arthritis and significant lymphedema bilateral lower extremities.  He was seen by orthopedic surgeon who recommended bracing and conservative treatment, consideration for surgical intervention if no improvement.        The following portions of the patient's history were reviewed and updated as appropriate: allergies, current medications, past family history, past medical history, past social history, past surgical history and problem list.       Review of Systems   Constitutional: Negative for fever.   Musculoskeletal: positive for ankle pain and difficulty walking.      Objective:  Resp 18   Ht 5' 7\" (1.702 m)   Wt 99.3 kg (219 lb)   SpO2 98%   BMI 34.30 kg/m²      Skin: Venous stasis edema, no open wounds or erythema, no signs of infection  Neurologic: Neurologic exam is normal throughout lower extremities, Awake, alert, and oriented x3, no apparent distress.   Musculoskeletal:  Inspection: Notable edema in the ankle and leg  Limited motion with ankle dorsiflexion and plantarflexion eversion and inversion  Notable crepitus with ankle motion  Pain in the tibiotalar joint               Imaging:     See final report      Assessment/Plan:  1. Osteoarthritis of right ankle, unspecified osteoarthritis type    2. Lymphedema    Patient is presenting today for consideration for repeat corticosteroid injection under ultrasound guidance for right ankle.  Patient has had positive response to the prior injection therapy and repeat injection was performed today under ultrasound guidance.  Did advise patient to follow-up with [place] ankle brace recommended by foot and ankle surgery.  "

## 2024-10-18 NOTE — PROGRESS NOTES
Medium joint arthrocentesis: R ankle  Universal Protocol:  Consent: Verbal consent obtained.  Risks and benefits: risks, benefits and alternatives were discussed  Consent given by: patient  Patient identity confirmed: verbally with patient  Supporting Documentation  Indications: pain   Procedure Details  Location: ankle - R ankle  Preparation: Patient was prepped and draped in the usual sterile fashion  Needle size: 25 G  Ultrasound guidance: yes  Approach: anteromedial  Medications administered: 1 mL bupivacaine 0.25 %; 40 mg triamcinolone acetonide 40 mg/mL

## 2024-12-09 ENCOUNTER — OFFICE VISIT (OUTPATIENT)
Dept: URGENT CARE | Facility: CLINIC | Age: 84
End: 2024-12-09
Payer: COMMERCIAL

## 2024-12-09 VITALS
OXYGEN SATURATION: 98 % | BODY MASS INDEX: 34.3 KG/M2 | SYSTOLIC BLOOD PRESSURE: 136 MMHG | RESPIRATION RATE: 19 BRPM | DIASTOLIC BLOOD PRESSURE: 72 MMHG | WEIGHT: 219 LBS | HEART RATE: 78 BPM | TEMPERATURE: 98 F

## 2024-12-09 DIAGNOSIS — H61.22 IMPACTED CERUMEN OF LEFT EAR: ICD-10-CM

## 2024-12-09 DIAGNOSIS — H66.002 ACUTE SUPPURATIVE OTITIS MEDIA OF LEFT EAR WITHOUT SPONTANEOUS RUPTURE OF TYMPANIC MEMBRANE, RECURRENCE NOT SPECIFIED: Primary | ICD-10-CM

## 2024-12-09 PROCEDURE — 69209 REMOVE IMPACTED EAR WAX UNI: CPT | Performed by: PHYSICIAN ASSISTANT

## 2024-12-09 PROCEDURE — 99203 OFFICE O/P NEW LOW 30 MIN: CPT | Performed by: PHYSICIAN ASSISTANT

## 2024-12-09 PROCEDURE — S9088 SERVICES PROVIDED IN URGENT: HCPCS | Performed by: PHYSICIAN ASSISTANT

## 2024-12-09 RX ORDER — AMOXICILLIN 500 MG/1
500 CAPSULE ORAL EVERY 8 HOURS SCHEDULED
Qty: 21 CAPSULE | Refills: 0 | Status: SHIPPED | OUTPATIENT
Start: 2024-12-09 | End: 2024-12-16

## 2024-12-09 NOTE — PATIENT INSTRUCTIONS
From the left ear today.  Recommended oral antibiotics for ear infection.    Follow up with PCP in 3-5 days.  Proceed to  ER if symptoms worsen.    If tests are performed, our office will contact you with results only if changes need to made to the care plan discussed with you at the visit. You can review your full results on [facility].

## 2024-12-09 NOTE — PROGRESS NOTES
[facility]        NAME: Yunior Holloway is a 84 y.o. male  : 1940    MRN: [id number]  DATE: 2024  TIME: 9:47 AM    Assessment and Plan   Acute suppurative otitis media of left ear without spontaneous rupture of tympanic membrane, recurrence not specified [H66.002]  1. Acute suppurative otitis media of left ear without spontaneous rupture of tympanic membrane, recurrence not specified  amoxicillin (AMOXIL) 500 mg capsule      2. Impacted cerumen of left ear              Patient Instructions     Patient Instructions   From the left ear today.  Recommended oral antibiotics for ear infection.    Follow up with PCP in 3-5 days.  Proceed to  ER if symptoms worsen.    If tests are performed, our office will contact you with results only if changes need to made to the care plan discussed with you at the visit. You can review your full results on [facility].        Chief Complaint     Chief Complaint   Patient presents with    Earache     Got a flu shot a few weeks ago and has been sick since then. 2 days ago his left ear completely clogged up, Can't hear out of it.          History of Present Illness       Earache   There is pain in the left ear. This is a new problem. Episode onset: 2 days ago. The problem occurs constantly. The problem has been unchanged. There has been no fever. Associated symptoms include hearing loss. Pertinent negatives include no ear discharge.       Review of Systems   Review of Systems   HENT:  Positive for ear pain and hearing loss. Negative for ear discharge.    All other systems reviewed and are negative.        Current Medications       Current Outpatient Medications:     allopurinol (ZYLOPRIM) 100 mg tablet, Take 100 mg by mouth daily, Disp: , Rfl:     amoxicillin (AMOXIL) 500 mg capsule, Take 1 capsule (500 mg total) by mouth every 8 (eight) hours for 7 days, Disp: 21 capsule, Rfl: 0    aspirin (ECOTRIN LOW STRENGTH) 81 mg EC tablet, , Disp: , Rfl:      levothyroxine 50 mcg tablet, Take 1 tablet by mouth daily, Disp: , Rfl:     rOPINIRole (REQUIP) 4 mg tablet, Take 4 mg by mouth 2 (two) times a day, Disp: , Rfl:     simvastatin (ZOCOR) 40 mg tablet, Take 40 mg by mouth every other day, Disp: , Rfl:     Spironolactone 25 MG/5ML SUSP, Take 25 mg by mouth in the morning, Disp: , Rfl:     Current Allergies     Allergies as of 12/09/2024    (No Known Allergies)            The following portions of the patient's history were reviewed and updated as appropriate: allergies, current medications, past family history, past medical history, past social history, past surgical history and problem list.     Past Medical History:   Diagnosis Date    Cancer (HCC)     Gout     Heart disease     HL (hearing loss)     Hypertension     Osteoarthritis Thanks    Thyroid disease        Past Surgical History:   Procedure Laterality Date    ANKLE SURGERY      CARDIAC SURGERY      stent placed around 2010    SKIN CANCER EXCISION      TONSILLECTOMY         Family History   Problem Relation Age of Onset    No Known Problems Mother     No Known Problems Father          Medications have been verified.        Objective   /72   Pulse 78   Temp 98 °F (36.7 °C)   Resp 19   Wt 99.3 kg (219 lb)   SpO2 98%   BMI 34.30 kg/m²        Physical Exam     Physical Exam  Vitals and nursing note reviewed.   Constitutional:       Appearance: Normal appearance.   HENT:      Right Ear: Tympanic membrane, ear canal and external ear normal. There is no impacted cerumen.      Left Ear: Ear canal and external ear normal. There is impacted cerumen. Tympanic membrane is erythematous and bulging.   Neurological:      General: No focal deficit present.      Mental Status: He is alert and oriented to person, place, and time.   Psychiatric:         Mood and Affect: Mood normal.         Behavior: Behavior normal.         Ear cerumen removal    Date/Time: 12/9/2024 9:00 AM    Performed by: Sonia Cordova  PAULA  Authorized by: Sonia Cordova PA-C  Universal Protocol:  Consent: Verbal consent obtained.  Consent given by: patient    Patient location:  Clinic  Procedure details:     Location:  L ear    Procedure type: irrigation only    Post-procedure details:     Complication:  None    Hearing quality:  Improved    Patient tolerance of procedure:  Tolerated well, no immediate complications

## 2025-07-07 ENCOUNTER — OFFICE VISIT (OUTPATIENT)
Dept: URGENT CARE | Facility: CLINIC | Age: 85
End: 2025-07-07
Payer: COMMERCIAL

## 2025-07-07 VITALS
SYSTOLIC BLOOD PRESSURE: 144 MMHG | TEMPERATURE: 97.2 F | HEART RATE: 60 BPM | RESPIRATION RATE: 20 BRPM | DIASTOLIC BLOOD PRESSURE: 70 MMHG | OXYGEN SATURATION: 96 %

## 2025-07-07 DIAGNOSIS — L97.921 NON-PRESSURE CHRONIC ULCER LEFT LOWER LEG, LIMITED TO BREAKDOWN SKIN (HCC): Primary | ICD-10-CM

## 2025-07-07 PROBLEM — E55.9 VITAMIN D DEFICIENCY: Status: ACTIVE | Noted: 2020-11-18

## 2025-07-07 PROBLEM — N18.31 STAGE 3A CHRONIC KIDNEY DISEASE (HCC): Status: ACTIVE | Noted: 2025-05-13

## 2025-07-07 PROBLEM — I89.0 LYMPHEDEMA OF BOTH LOWER EXTREMITIES: Status: ACTIVE | Noted: 2023-05-18

## 2025-07-07 PROBLEM — I25.10 CORONARY ARTERY DISEASE INVOLVING NATIVE CORONARY ARTERY OF NATIVE HEART WITHOUT ANGINA PECTORIS: Status: ACTIVE | Noted: 2023-02-02

## 2025-07-07 PROBLEM — E66.09 CLASS 1 OBESITY DUE TO EXCESS CALORIES WITH BODY MASS INDEX (BMI) OF 32.0 TO 32.9 IN ADULT: Status: ACTIVE | Noted: 2020-11-18

## 2025-07-07 PROBLEM — I87.2 VENOUS INSUFFICIENCY OF BOTH LOWER EXTREMITIES: Status: ACTIVE | Noted: 2023-06-06

## 2025-07-07 PROBLEM — E78.2 MIXED HYPERLIPIDEMIA: Status: ACTIVE | Noted: 2020-05-14

## 2025-07-07 PROBLEM — R73.03 PREDIABETES: Status: ACTIVE | Noted: 2020-11-18

## 2025-07-07 PROBLEM — E66.811 CLASS 1 OBESITY DUE TO EXCESS CALORIES WITH BODY MASS INDEX (BMI) OF 32.0 TO 32.9 IN ADULT: Status: ACTIVE | Noted: 2020-11-18

## 2025-07-07 PROBLEM — I10 ESSENTIAL HYPERTENSION: Status: ACTIVE | Noted: 2025-07-07

## 2025-07-07 PROBLEM — G47.30 SLEEP APNEA: Status: ACTIVE | Noted: 2020-11-18

## 2025-07-07 PROBLEM — E03.9 ACQUIRED HYPOTHYROIDISM: Status: ACTIVE | Noted: 2020-05-14

## 2025-07-07 PROCEDURE — S9088 SERVICES PROVIDED IN URGENT: HCPCS | Performed by: PHYSICIAN ASSISTANT

## 2025-07-07 PROCEDURE — 99214 OFFICE O/P EST MOD 30 MIN: CPT | Performed by: PHYSICIAN ASSISTANT

## 2025-07-07 RX ORDER — CEPHALEXIN 500 MG/1
500 CAPSULE ORAL EVERY 8 HOURS SCHEDULED
Qty: 21 CAPSULE | Refills: 0 | Status: SHIPPED | OUTPATIENT
Start: 2025-07-07 | End: 2025-07-14

## 2025-07-07 NOTE — PROGRESS NOTES
Patient Name: Yunior Holloway      : 1940      MRN: [id number]  Encounter Provider: Carline Gibbons PA-C  Encounter Date: 2025  Encounter department: 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    Patient Instructions:   There are no Patient Instructions on file for this visit.    Subjective   Chief Complaint   Patient presents with    Leg Pain     Pt here for left leg pain knicked it and was weeping and is all water and nothing to stop it. Ythis happened 3 days ago          Yunior Holloway is a 85 y.o.male  Patient presents with left lower leg pain 3 days.  He states he nicked it on something and it has been continuously weeping since.  He has a history of chronic venous insufficiency and lymphedema of the bilateral lower extremities.  He has been putting gauze on it to stop it from weeping.        Review of Systems   Constitutional:  Negative for chills, fatigue and fever.   HENT:  Negative for congestion, ear pain, postnasal drip, rhinorrhea, sinus pressure, sinus pain, sneezing and sore throat.    Eyes:  Negative for pain and visual disturbance.   Respiratory:  Negative for cough and shortness of breath.    Cardiovascular:  Negative for chest pain and palpitations.   Gastrointestinal:  Negative for abdominal pain, diarrhea, nausea and vomiting.   Genitourinary:  Negative for dysuria and hematuria.   Musculoskeletal:  Negative for arthralgias, back pain and myalgias.   Skin:  Positive for wound. Negative for rash.   Neurological:  Negative  for dizziness, seizures, syncope, numbness and headaches.   All other systems reviewed and are negative.      Current Medications[1]  Allergies as of 07/07/2025    (No Known Allergies)     Past Medical History[2]  Past Surgical History[3]  Family History[4]     Objective   /70   Pulse 60   Temp (!) 97.2 °F (36.2 °C) (Tympanic)   Resp 20   SpO2 96%      Physical Exam  Vitals and nursing note reviewed.   Constitutional:       Appearance: Normal appearance. He is obese.   HENT:      Head: Normocephalic and atraumatic.     Cardiovascular:      Rate and Rhythm: Normal rate.   Pulmonary:      Effort: Pulmonary effort is normal.     Skin:     General: Skin is warm.      Comments: Bilateral lower legs very edematous.  Distal left lower leg anteriorly with quarter sized ulcerative lesion that is steadily weeping clear fluid.  Minimal erythema surrounding the wound.     Neurological:      General: No focal deficit present.      Mental Status: He is alert and oriented to person, place, and time.     Psychiatric:         Mood and Affect: Mood normal.         Behavior: Behavior normal.            [1]   Current Outpatient Medications:     allopurinol (ZYLOPRIM) 100 mg tablet, Take 100 mg by mouth in the morning., Disp: , Rfl:     aspirin (ECOTRIN LOW STRENGTH) 81 mg EC tablet, , Disp: , Rfl:     cephalexin (KEFLEX) 500 mg capsule, Take 1 capsule (500 mg total) by mouth every 8 (eight) hours for 7 days, Disp: 21 capsule, Rfl: 0    levothyroxine 50 mcg tablet, Take 1 tablet by mouth in the morning., Disp: , Rfl:     rOPINIRole (REQUIP) 4 mg tablet, Take 4 mg by mouth in the morning and 4 mg in the evening., Disp: , Rfl:     simvastatin (ZOCOR) 40 mg tablet, Take 40 mg by mouth every other day, Disp: , Rfl:     Spironolactone 25 MG/5ML SUSP, Take 25 mg by mouth in the morning, Disp: , Rfl:   [2]   Past Medical History:  Diagnosis Date    Cancer (HCC)     Gout     Heart disease     HL (hearing loss)     Hypertension      Osteoarthritis Thanks    Thyroid disease    [3]   Past Surgical History:  Procedure Laterality Date    ANKLE SURGERY      CARDIAC SURGERY      stent placed around 2010    SKIN CANCER EXCISION      TONSILLECTOMY     [4]   Family History  Problem Relation Name Age of Onset    No Known Problems Mother      No Known Problems Father

## 2025-07-15 ENCOUNTER — OFFICE VISIT (OUTPATIENT)
Dept: WOUND CARE | Facility: CLINIC | Age: 85
End: 2025-07-15
Payer: COMMERCIAL

## 2025-07-15 VITALS
RESPIRATION RATE: 18 BRPM | HEIGHT: 67 IN | WEIGHT: 215 LBS | BODY MASS INDEX: 33.74 KG/M2 | DIASTOLIC BLOOD PRESSURE: 76 MMHG | HEART RATE: 82 BPM | SYSTOLIC BLOOD PRESSURE: 179 MMHG | TEMPERATURE: 96.9 F

## 2025-07-15 DIAGNOSIS — S81.802D TRAUMATIC OPEN WOUND OF LEFT LOWER LEG WITH DELAYED HEALING: ICD-10-CM

## 2025-07-15 DIAGNOSIS — I87.2 VENOUS INSUFFICIENCY OF BOTH LOWER EXTREMITIES: Primary | ICD-10-CM

## 2025-07-15 DIAGNOSIS — I89.0 LYMPHEDEMA OF BOTH LOWER EXTREMITIES: ICD-10-CM

## 2025-07-15 PROCEDURE — 99204 OFFICE O/P NEW MOD 45 MIN: CPT | Performed by: ORTHOPAEDIC SURGERY

## 2025-07-15 PROCEDURE — 97597 DBRDMT OPN WND 1ST 20 CM/<: CPT | Performed by: ORTHOPAEDIC SURGERY

## 2025-07-15 PROCEDURE — 99213 OFFICE O/P EST LOW 20 MIN: CPT | Performed by: ORTHOPAEDIC SURGERY

## 2025-07-15 RX ORDER — LIDOCAINE 40 MG/G
CREAM TOPICAL ONCE
Status: COMPLETED | OUTPATIENT
Start: 2025-07-15 | End: 2025-07-15

## 2025-07-15 RX ADMIN — LIDOCAINE: 40 CREAM TOPICAL at 09:25

## 2025-07-19 ENCOUNTER — TELEPHONE (OUTPATIENT)
Dept: OTHER | Facility: OTHER | Age: 85
End: 2025-07-19

## 2025-07-19 NOTE — TELEPHONE ENCOUNTER
Pt called to verify his appts, he thought he accidentally cancelled one for Tuesday. Reviewed there was one w/wound care on 7/22 but is now on 7/24 @ 11 and 7/24 1:00 w/ PO [facility].

## 2025-07-23 ENCOUNTER — TELEPHONE (OUTPATIENT)
Dept: FAMILY MEDICINE CLINIC | Facility: CLINIC | Age: 85
End: 2025-07-23

## 2025-07-23 NOTE — TELEPHONE ENCOUNTER
Left message for patient to call insurance and change PCP to Dr Nory Melgar the overseeing PCP at this location prior to appointment.

## 2025-07-24 ENCOUNTER — OFFICE VISIT (OUTPATIENT)
Dept: FAMILY MEDICINE CLINIC | Facility: CLINIC | Age: 85
End: 2025-07-24
Payer: COMMERCIAL

## 2025-07-24 ENCOUNTER — OFFICE VISIT (OUTPATIENT)
Dept: WOUND CARE | Facility: CLINIC | Age: 85
End: 2025-07-24
Payer: COMMERCIAL

## 2025-07-24 VITALS
SYSTOLIC BLOOD PRESSURE: 120 MMHG | OXYGEN SATURATION: 98 % | BODY MASS INDEX: 36.41 KG/M2 | HEIGHT: 67 IN | WEIGHT: 232 LBS | HEART RATE: 91 BPM | RESPIRATION RATE: 20 BRPM | DIASTOLIC BLOOD PRESSURE: 62 MMHG | TEMPERATURE: 97.9 F

## 2025-07-24 VITALS
HEART RATE: 71 BPM | TEMPERATURE: 97.4 F | SYSTOLIC BLOOD PRESSURE: 161 MMHG | DIASTOLIC BLOOD PRESSURE: 70 MMHG | RESPIRATION RATE: 22 BRPM

## 2025-07-24 DIAGNOSIS — S81.802A TRAUMATIC OPEN WOUND OF LEFT LOWER LEG, INITIAL ENCOUNTER: Primary | ICD-10-CM

## 2025-07-24 DIAGNOSIS — N18.31 STAGE 3A CHRONIC KIDNEY DISEASE (HCC): ICD-10-CM

## 2025-07-24 DIAGNOSIS — I87.2 VENOUS INSUFFICIENCY OF BOTH LOWER EXTREMITIES: ICD-10-CM

## 2025-07-24 DIAGNOSIS — S81.802D TRAUMATIC OPEN WOUND OF LEFT LOWER LEG WITH DELAYED HEALING: ICD-10-CM

## 2025-07-24 DIAGNOSIS — I89.0 LYMPHEDEMA: Primary | ICD-10-CM

## 2025-07-24 DIAGNOSIS — I89.0 LYMPHEDEMA OF BOTH LOWER EXTREMITIES: ICD-10-CM

## 2025-07-24 DIAGNOSIS — Z79.899 MEDICATION MANAGEMENT: ICD-10-CM

## 2025-07-24 DIAGNOSIS — E03.9 HYPOTHYROIDISM, UNSPECIFIED TYPE: ICD-10-CM

## 2025-07-24 PROCEDURE — 99204 OFFICE O/P NEW MOD 45 MIN: CPT | Performed by: NURSE PRACTITIONER

## 2025-07-24 PROCEDURE — 97597 DBRDMT OPN WND 1ST 20 CM/<: CPT | Performed by: ORTHOPAEDIC SURGERY

## 2025-07-24 RX ORDER — LIDOCAINE 40 MG/G
CREAM TOPICAL ONCE
Status: COMPLETED | OUTPATIENT
Start: 2025-07-24 | End: 2025-07-24

## 2025-07-24 RX ORDER — SPIRONOLACTONE AND HYDROCHLOROTHIAZIDE 25; 25 MG/1; MG/1
TABLET ORAL
COMMUNITY
Start: 2025-06-24 | End: 2025-07-24 | Stop reason: ALTCHOICE

## 2025-07-24 RX ORDER — CHLORTHALIDONE 25 MG/1
25 TABLET ORAL DAILY
Qty: 30 TABLET | Refills: 0 | Status: SHIPPED | OUTPATIENT
Start: 2025-07-24

## 2025-07-24 RX ORDER — LEVOTHYROXINE SODIUM 75 UG/1
75 TABLET ORAL
Start: 2025-07-24

## 2025-07-24 RX ADMIN — LIDOCAINE: 40 CREAM TOPICAL at 10:56

## 2025-07-24 NOTE — PROGRESS NOTES
Name: Yunior Holloway      : 1940      MRN: [id number]  Encounter Provider: TY Looney  Encounter Date: 2025   Encounter department: [facility]  :  Assessment & Plan  Lymphedema  Patient has bilateral lymphedema going for more than 10 years reports he has tried multiple modalities for improvement continue to have swelling.  Encouraged to continue with limited treatment.  1 pill ordered to assist in getting rid of some fluid.  Maintain low-salt diet.  Encouraged to elevate legs as possible  Orders:    chlorthalidone 25 mg tablet; Take 1 tablet (25 mg total) by mouth daily    Venous insufficiency of both lower extremities    Orders:    Ambulatory Referral to Family Practice    Lymphedema of both lower extremities  Patient has venous insufficiency on lower leg extremities has been ongoing for more than 10 years.  Reports that he is followed up with vascular and has had therapy.  Urged to continue low-salt diet elevate legs continue with limb therapy  Orders:    Ambulatory Referral to Family Practice    Albumin / creatinine urine ratio    Traumatic open wound of left lower leg with delayed healing  Patient follows with wound care also does says that he changes wound twice a day.  Orders:    Ambulatory Referral to Family Practice    Stage 3a chronic kidney disease (HCC)  Lab Results   Component Value Date    EGFR 56 (L) 2025    EGFR 65 2024    EGFR 68 2024    CREATININE 1.24 2025    CREATININE 1.11 2024    CREATININE 1.07 2024       Orders:    Comprehensive metabolic panel    Hypothyroidism, unspecified type    Orders:    levothyroxine 75 mcg tablet; Take 1 tablet (75 mcg total) by mouth daily in the early morning    Medication management    Orders:    CBC and differential    TSH, 3rd generation with Free T4 reflex           History of Present Illness   Patient is here to establish care.  Has multiple health problems main concern is  "lymphedema      Review of Systems   Constitutional: Negative.    HENT: Negative.     Eyes: Negative.    Respiratory: Negative.     Cardiovascular:  Positive for leg swelling.   Gastrointestinal: Negative.    Endocrine: Negative.    Genitourinary: Negative.    Musculoskeletal: Negative.    Skin: Negative.    Allergic/Immunologic: Negative.    Neurological: Negative.    Hematological: Negative.    Psychiatric/Behavioral: Negative.         Objective   /62 (BP Location: Left arm, Patient Position: Sitting, Cuff Size: Large)   Pulse 91   Temp 97.9 °F (36.6 °C) (Temporal)   Resp 20   Ht 5' 7\" (1.702 m)   Wt 105 kg (232 lb)   SpO2 98%   BMI 36.34 kg/m²      Physical Exam  Vitals and nursing note reviewed.   Constitutional:       General: He is not in acute distress.     Appearance: He is well-developed. He is obese.   HENT:      Head: Normocephalic and atraumatic.     Cardiovascular:      Rate and Rhythm: Normal rate and regular rhythm.      Pulses: Normal pulses.      Heart sounds: Normal heart sounds. No murmur heard.  Pulmonary:      Effort: Pulmonary effort is normal. No respiratory distress.      Breath sounds: Normal breath sounds.   Abdominal:      Palpations: Abdomen is soft.      Tenderness: There is no abdominal tenderness.     Musculoskeletal:         General: Swelling (+4 pitting edema) and tenderness present.      Cervical back: Normal range of motion and neck supple.     Skin:     General: Skin is warm and dry.      Capillary Refill: Capillary refill takes less than 2 seconds.     Neurological:      General: No focal deficit present.      Mental Status: He is alert and oriented to person, place, and time.     Psychiatric:         Mood and Affect: Mood normal.         Behavior: Behavior normal.         Thought Content: Thought content normal.         Judgment: Judgment normal.         "

## 2025-07-24 NOTE — PROGRESS NOTES
Patient ID: Yunior Holloway is a 85 y.o. male Date of Birth 1940       Chief Complaint   Patient presents with    Follow Up Wound Care Visit     Left Leg wound       Allergies:  Patient has no known allergies.    Diagnosis:   Diagnosis ICD-10-CM Associated Orders   1. Traumatic open wound of left lower leg, initial encounter  S81.802A Wound cleansing and dressings Left Pretibial     lidocaine (LMX) 4 % cream     Wound Procedure Treatment Left Pretibial     Debridement      2. Lymphedema of both lower extremities  I89.0       3. Venous insufficiency of both lower extremities  I87.2            Assessment and Plan :  Follow up evaluation of traumatic open wound on LLE progressively healing   Debrided as below  Wound management with silver alginate and Calmoseptine to periwound with Convamax.  See wound orders.  Continue use of compression stockings.  No harsh cleansers such as alcohol, peroxide, or antibacterial soap, do not submerge in water such as bathtub, hot tub, swimming pool, ocean, etc.   Can cleanse with mild soap and water at dressing changes.   Counseled on importance of frequent elevation of leg and increase exercise/walking for wound healing.  Counseled on alcohol  cessation.  Counseled on eating adequate protein (chicken, fish, yogurt, eggs and nuts (3-4 servings per day) and controlling diabetes.   Referred to vascular for evaluation and possible intervention for venous disease   Follow-up in 1-2 week(s) or call sooner with questions or concerns or any signs of infection such as redness, swelling, increased/purulent drainage, fever, chills, increased severe pain.     Subjective:   7/15: Patient is an 85 y.o. male with pmhx Hypothyroidism, CAD, HTN, HLD,  CKD Stage III, BLE Lymphedema, DMII (A1c 7.1), Alcohol use (1 cocktail daily), BLE Venous insufficiency and obesity who presents for initial evaluation of open wound on LLLE which has been present since 7/4/25 after hitting his leg against a dryer vent.  Since then pt was prescribed Keflex for 7 days which he has yet to start taking. Pt has been applying vaseline and DSD on wound bed.  No smoking or drug use.  Pt denies any sob, fatigue, N/V, CP, fevers or chills.    7/24:  Patient presents for followup evaluation of open traumatic wound on LLE venous ulcer.  No new complaints. No increased pain or drainage. Has been using silver alginate with calmoseptine to wound edges and covering with Convamax. Pt has been using compression stockings for compression. Pt denies any fevers or chills.      The following portions of the patient's history were reviewed and updated as appropriate:   Problem List[1]  Past Medical History[2]  Past Surgical History[3]  Family History[4]  Social History[5]  Current Medications[6]    Review of Systems   Constitutional:  Negative for appetite change, chills, fatigue, fever and unexpected weight change.   HENT:  Negative for congestion, hearing loss and postnasal drip.    Respiratory:  Negative for cough and shortness of breath.    Cardiovascular:  Positive for leg swelling.   Musculoskeletal:  Positive for gait problem (ambulates with cane).   Skin:  Positive for wound (LLE). Negative for rash.   Neurological:  Negative for numbness.   Hematological:  Does not bruise/bleed easily.   Psychiatric/Behavioral: Negative.           Objective:  /70   Pulse 71   Temp (!) 97.4 °F (36.3 °C)   Resp 22   Pain Score:   5     Physical Exam  Vitals reviewed.   Constitutional:       General: He is not in acute distress.     Appearance: Normal appearance. He is well-developed. He is obese.   HENT:      Head: Normocephalic and atraumatic.     Cardiovascular:      Rate and Rhythm: Normal rate.   Pulmonary:      Effort: Pulmonary effort is normal.     Musculoskeletal:         General: No deformity.      Right lower leg: Edema present.      Left lower leg: Edema present.     Skin:     General: Skin is warm and dry.      Findings: Wound (LLE) present.  "          Comments: Exudate and biofilm on pink granulated wound bed with serosanguinous drainage. See wound assessment     Neurological:      General: No focal deficit present.      Mental Status: He is alert and oriented to person, place, and time.      Gait: Gait abnormal.     Psychiatric:         Mood and Affect: Mood and affect normal.         Behavior: Behavior normal. Behavior is cooperative.         Thought Content: Thought content normal.              Wound 07/15/25 Traumatic Pretibial Left (Active)   Wound Description Yellow;Slough;Pink;Epithelialization 07/24/25 1050   Non-staged Wound Description Full thickness 07/24/25 1050   Wound Length (cm) 1.1 cm 07/24/25 1050   Wound Width (cm) 5.5 cm 07/24/25 1050   Wound Depth (cm) 0.1 cm 07/24/25 1050   Wound Surface Area (cm^2) 4.75 cm^2 07/24/25 1050   Wound Volume (cm^3) 0.317 cm^3 07/24/25 1050   Calculated Wound Volume (cm^3) 0.61 cm^3 07/24/25 1050   Change in Wound Size % 36.46 07/24/25 1050   Drainage Amount Moderate 07/24/25 1050   Drainage Description Serous;Serosanguineous 07/24/25 1050   Anabela-wound Assessment Pink;Edema;Fragile 07/24/25 1050   Dressing Status Intact 07/24/25 1050     Debridement     Date/Time: 7/24/2025 11:00 AM    Universal Protocol:  procedure performed by consultantConsent: Verbal consent obtained. Written consent obtained  Risks and benefits: risks, benefits and alternatives were discussed  Consent given by: patient  Time out: Immediately prior to procedure a \"time out\" was called to verify the correct patient, procedure, equipment, support staff and site/side marked as required.  Patient understanding: patient states understanding of the procedure being performed  Patient identity confirmed: verbally with patient    Debridement Details  Performed by: PA  Debridement type: selective  Pain control: lidocaine 4%    Post-debridement measurements  Length (cm): 1.1  Width (cm): 5.5  Depth (cm): 0.1  Percent debrided: 100%  Surface " "Area (cm^2): 4.75  Area Debrided (cm^2): 4.75  Volume (cm^3): 0.32    Devitalized tissue debrided: biofilm and exudate  Instrument(s) utilized: curette  Bleeding: small  Hemostasis obtained with: pressure  Procedural pain (0-10): 0  Post-procedural pain: 0   Response to treatment: procedure was tolerated well                 Wound Instructions:  Orders Placed This Encounter   Procedures    Wound cleansing and dressings Left Pretibial     Wound cleansing and dressings Left Pretibial       Left Leg Wound     Wash your hands with soap and water.  Remove old dressing, discard into plastic bag and place in trash.  Cleanse the wound with normal saline wound  prior to applying a clean dressing. Do not use tissue or cotton balls. Do not scrub the wound. Pat dry using gauze.     Shower no     Apply Calmoseptine or Desitin to shakira wound to protect from drainage   Apply silver alginate to the leg wound.  Cover with super ABSORPTIVE DRESSING  Secure with Gina and tape  Change dressing daily           Continue the lymphedema pumps twice a day     Standing Status:   Future     Expiration Date:   7/31/2025    Wound Procedure Treatment Left Pretibial     This order was created via procedure documentation    Debridement     This order was created via procedure documentation       Basia Singh PA-C, [facility]      Portions of the record may have been created with voice recognition software. Occasional wrong word or \"sound alike\" substitutions may have occurred due to the inherent limitations of voice recognition software. Read the chart carefully and recognize, using context, where substitutions have occurred.           [1]   Patient Active Problem List  Diagnosis    Post-traumatic arthritis of right ankle    Congenital hindfoot valgus    Acquired hypothyroidism    Class 1 obesity due to excess calories with body mass index (BMI) of 32.0 to 32.9 in adult    Coronary artery disease involving native coronary artery of native heart " without angina pectoris    Essential hypertension    Lymphedema of both lower extremities    Mixed hyperlipidemia    Prediabetes    Sleep apnea    Stage 3a chronic kidney disease (HCC)    Venous insufficiency of both lower extremities    Vitamin D deficiency   [2]   Past Medical History:  Diagnosis Date    Cancer (HCC)     Gout     Heart disease     HL (hearing loss)     Hypertension     Osteoarthritis Thanks    Thyroid disease    [3]   Past Surgical History:  Procedure Laterality Date    ANKLE SURGERY      CARDIAC SURGERY      stent placed around 2010    SKIN CANCER EXCISION      TONSILLECTOMY     [4]   Family History  Problem Relation Name Age of Onset    No Known Problems Mother      No Known Problems Father     [5]   Social History  Socioeconomic History    Marital status: Single   Tobacco Use    Smoking status: Never    Smokeless tobacco: Never   Vaping Use    Vaping status: Never Used   Substance and Sexual Activity    Alcohol use: Yes     Alcohol/week: 2.0 standard drinks of alcohol     Types: 2 Standard drinks or equivalent per week    Drug use: Never    Sexual activity: Not Currently     Social Drivers of Health     Financial Resource Strain: Low Risk  (7/14/2024)    Received from [facility]    Overall Financial Resource Strain (CARDIA)     Difficulty of Paying Living Expenses: Not hard at all   Food Insecurity: No Food Insecurity (7/14/2024)    Received from [facility]    Hunger Vital Sign     Within the past 12 months, you worried that your food would run out before you got the money to buy more.: Never true     Within the past 12 months, the food you bought just didn't last and you didn't have money to get more.: Never true   Transportation Needs: No Transportation Needs (7/14/2024)    Received from [facility]    PRAPARE - Transportation     Lack of Transportation (Medical): No     Lack of Transportation (Non-Medical): No   Physical Activity: Unknown  (5/15/2023)    Received from [facility]    Exercise Vital Sign     Days of Exercise per Week: Patient refused   Stress: No Stress Concern Present (7/14/2024)    Received from [facility]    [employer] - Occupational Stress Questionnaire     Feeling of Stress : Not at all   Social Connections: Feeling Socially Integrated (7/14/2024)    Received from [facility]    OASIS : Social Isolation     How often do you feel lonely or isolated from those around you?: Rarely   Intimate Partner Violence: Not At Risk (7/14/2024)    Received from [facility]    Humiliation, Afraid, Rape, and Kick questionnaire     Within the last year, have you been afraid of your partner or ex-partner?: No     Within the last year, have you been humiliated or emotionally abused in other ways by your partner or ex-partner?: No     Within the last year, have you been kicked, hit, slapped, or otherwise physically hurt by your partner or ex-partner?: No     Within the last year, have you been raped or forced to have any kind of sexual activity by your partner or ex-partner?: No   Housing Stability: Low Risk  (7/14/2024)    Received from [facility]    Housing Stability Vital Sign     In the last 12 months, was there a time when you were not able to pay the mortgage or rent on time?: No     In the past 12 months, how many times have you moved where you were living?: 0     At any time in the past 12 months, were you homeless or living in a shelter (including now)?: No   [6]   Current Outpatient Medications:     allopurinol (ZYLOPRIM) 100 mg tablet, Take 100 mg by mouth in the morning., Disp: , Rfl:     aspirin (ECOTRIN LOW STRENGTH) 81 mg EC tablet, , Disp: , Rfl:     levothyroxine 50 mcg tablet, Take 1 tablet by mouth in the morning., Disp: , Rfl:     rOPINIRole (REQUIP) 4 mg tablet, Take 4 mg by mouth in the morning and 4 mg in the  evening., Disp: , Rfl:     simvastatin (ZOCOR) 40 mg tablet, Take 40 mg by mouth every other day, Disp: , Rfl:     Spironolactone 25 MG/5ML SUSP, Take 25 mg by mouth in the morning, Disp: , Rfl:   No current facility-administered medications for this visit.

## 2025-07-24 NOTE — ASSESSMENT & PLAN NOTE
Patient has venous insufficiency on lower leg extremities has been ongoing for more than 10 years.  Reports that he is followed up with vascular and has had therapy.  Urged to continue low-salt diet elevate legs continue with limb therapy  Orders:    Ambulatory Referral to Family Practice    Albumin / creatinine urine ratio

## 2025-07-24 NOTE — PATIENT INSTRUCTIONS
Orders Placed This Encounter   Procedures    Wound cleansing and dressings Left Pretibial     Wound cleansing and dressings Left Pretibial       Left Leg Wound     Wash your hands with soap and water.  Remove old dressing, discard into plastic bag and place in trash.  Cleanse the wound with normal saline wound  prior to applying a clean dressing. Do not use tissue or cotton balls. Do not scrub the wound. Pat dry using gauze.     Shower no     Apply Calmoseptine or Desitin to shakira wound to protect from drainage   Apply silver alginate to the leg wound.  Cover with super ABSORPTIVE DRESSING  Secure with Gina and tape  Change dressing daily           Continue the lymphedema pumps twice a day     Standing Status:   Future     Expiration Date:   7/31/2025    Wound Procedure Treatment Left Pretibial     This order was created via procedure documentation

## 2025-07-24 NOTE — ASSESSMENT & PLAN NOTE
Lab Results   Component Value Date    EGFR 56 (L) 04/16/2025    EGFR 65 07/09/2024    EGFR 68 03/07/2024    CREATININE 1.24 04/16/2025    CREATININE 1.11 07/09/2024    CREATININE 1.07 03/07/2024       Orders:    Comprehensive metabolic panel

## 2025-07-24 NOTE — PROGRESS NOTES
Wound Procedure Treatment Left Pretibial    Performed by: Torie Frank RN  Authorized by: Basia Singh PA-C  Associated wounds:   Wound 07/15/25 Traumatic Pretibial Left    Wound cleansed with:  NSS   Applied primary dressing:  Silver and Calcium alginate   Applied secondary dressing:  Gauze   Dressing secured with:  Tape and Gina

## 2025-07-29 ENCOUNTER — APPOINTMENT (OUTPATIENT)
Dept: LAB | Facility: CLINIC | Age: 85
End: 2025-07-29
Payer: COMMERCIAL

## 2025-07-29 LAB
ALBUMIN SERPL BCG-MCNC: 4 G/DL (ref 3.5–5)
ALP SERPL-CCNC: 68 U/L (ref 34–104)
ALT SERPL W P-5'-P-CCNC: 13 U/L (ref 7–52)
ANION GAP SERPL CALCULATED.3IONS-SCNC: 6 MMOL/L (ref 4–13)
AST SERPL W P-5'-P-CCNC: 14 U/L (ref 13–39)
BASOPHILS # BLD AUTO: 0.04 THOUSANDS/ÂΜL (ref 0–0.1)
BASOPHILS NFR BLD AUTO: 1 % (ref 0–1)
BILIRUB SERPL-MCNC: 0.72 MG/DL (ref 0.2–1)
BUN SERPL-MCNC: 20 MG/DL (ref 5–25)
CALCIUM SERPL-MCNC: 9.2 MG/DL (ref 8.4–10.2)
CHLORIDE SERPL-SCNC: 106 MMOL/L (ref 96–108)
CO2 SERPL-SCNC: 28 MMOL/L (ref 21–32)
CREAT SERPL-MCNC: 1.03 MG/DL (ref 0.6–1.3)
CREAT UR-MCNC: 128.5 MG/DL
EOSINOPHIL # BLD AUTO: 0.25 THOUSAND/ÂΜL (ref 0–0.61)
EOSINOPHIL NFR BLD AUTO: 4 % (ref 0–6)
ERYTHROCYTE [DISTWIDTH] IN BLOOD BY AUTOMATED COUNT: 14.4 % (ref 11.6–15.1)
GFR SERPL CREATININE-BSD FRML MDRD: 65 ML/MIN/1.73SQ M
GLUCOSE P FAST SERPL-MCNC: 134 MG/DL (ref 65–99)
HCT VFR BLD AUTO: 45.2 % (ref 36.5–49.3)
HGB BLD-MCNC: 14.3 G/DL (ref 12–17)
IMM GRANULOCYTES # BLD AUTO: 0.03 THOUSAND/UL (ref 0–0.2)
IMM GRANULOCYTES NFR BLD AUTO: 0 % (ref 0–2)
LYMPHOCYTES # BLD AUTO: 1.56 THOUSANDS/ÂΜL (ref 0.6–4.47)
LYMPHOCYTES NFR BLD AUTO: 22 % (ref 14–44)
MCH RBC QN AUTO: 29.3 PG (ref 26.8–34.3)
MCHC RBC AUTO-ENTMCNC: 31.6 G/DL (ref 31.4–37.4)
MCV RBC AUTO: 93 FL (ref 82–98)
MICROALBUMIN UR-MCNC: 50.9 MG/L
MICROALBUMIN/CREAT 24H UR: 40 MG/G CREATININE (ref 0–30)
MONOCYTES # BLD AUTO: 0.46 THOUSAND/ÂΜL (ref 0.17–1.22)
MONOCYTES NFR BLD AUTO: 7 % (ref 4–12)
NEUTROPHILS # BLD AUTO: 4.62 THOUSANDS/ÂΜL (ref 1.85–7.62)
NEUTS SEG NFR BLD AUTO: 66 % (ref 43–75)
NRBC BLD AUTO-RTO: 0 /100 WBCS
PLATELET # BLD AUTO: 165 THOUSANDS/UL (ref 149–390)
PMV BLD AUTO: 9.7 FL (ref 8.9–12.7)
POTASSIUM SERPL-SCNC: 3.9 MMOL/L (ref 3.5–5.3)
PROT SERPL-MCNC: 6.8 G/DL (ref 6.4–8.4)
RBC # BLD AUTO: 4.88 MILLION/UL (ref 3.88–5.62)
SODIUM SERPL-SCNC: 140 MMOL/L (ref 135–147)
T4 FREE SERPL-MCNC: 0.7 NG/DL (ref 0.61–1.12)
TSH SERPL DL<=0.05 MIU/L-ACNC: 7.95 UIU/ML (ref 0.45–4.5)
WBC # BLD AUTO: 6.96 THOUSAND/UL (ref 4.31–10.16)

## 2025-07-29 PROCEDURE — 80053 COMPREHEN METABOLIC PANEL: CPT | Performed by: NURSE PRACTITIONER

## 2025-07-29 PROCEDURE — 82043 UR ALBUMIN QUANTITATIVE: CPT | Performed by: NURSE PRACTITIONER

## 2025-07-29 PROCEDURE — 84439 ASSAY OF FREE THYROXINE: CPT | Performed by: NURSE PRACTITIONER

## 2025-07-29 PROCEDURE — 85025 COMPLETE CBC W/AUTO DIFF WBC: CPT | Performed by: NURSE PRACTITIONER

## 2025-07-29 PROCEDURE — 84443 ASSAY THYROID STIM HORMONE: CPT | Performed by: NURSE PRACTITIONER

## 2025-07-29 PROCEDURE — 36415 COLL VENOUS BLD VENIPUNCTURE: CPT | Performed by: NURSE PRACTITIONER

## 2025-07-29 PROCEDURE — 82570 ASSAY OF URINE CREATININE: CPT | Performed by: NURSE PRACTITIONER

## 2025-08-05 ENCOUNTER — RA CDI HCC (OUTPATIENT)
Dept: OTHER | Facility: HOSPITAL | Age: 85
End: 2025-08-05

## 2025-08-07 ENCOUNTER — OFFICE VISIT (OUTPATIENT)
Dept: WOUND CARE | Facility: CLINIC | Age: 85
End: 2025-08-07
Payer: COMMERCIAL

## 2025-08-07 VITALS
RESPIRATION RATE: 18 BRPM | TEMPERATURE: 97.7 F | HEART RATE: 70 BPM | DIASTOLIC BLOOD PRESSURE: 76 MMHG | SYSTOLIC BLOOD PRESSURE: 162 MMHG

## 2025-08-07 DIAGNOSIS — I87.2 VENOUS INSUFFICIENCY OF BOTH LOWER EXTREMITIES: ICD-10-CM

## 2025-08-07 DIAGNOSIS — S81.802A TRAUMATIC OPEN WOUND OF LEFT LOWER LEG, INITIAL ENCOUNTER: Primary | ICD-10-CM

## 2025-08-07 DIAGNOSIS — I89.0 LYMPHEDEMA OF BOTH LOWER EXTREMITIES: ICD-10-CM

## 2025-08-07 PROCEDURE — 99213 OFFICE O/P EST LOW 20 MIN: CPT | Performed by: ORTHOPAEDIC SURGERY

## 2025-08-07 RX ORDER — LIDOCAINE 40 MG/G
CREAM TOPICAL ONCE
Status: COMPLETED | OUTPATIENT
Start: 2025-08-07 | End: 2025-08-07

## 2025-08-07 RX ADMIN — LIDOCAINE: 40 CREAM TOPICAL at 10:03

## 2025-08-14 ENCOUNTER — OFFICE VISIT (OUTPATIENT)
Dept: FAMILY MEDICINE CLINIC | Facility: CLINIC | Age: 85
End: 2025-08-14
Payer: COMMERCIAL

## 2025-08-14 PROBLEM — Z00.00 MEDICARE ANNUAL WELLNESS VISIT, SUBSEQUENT: Status: ACTIVE | Noted: 2025-08-14

## 2025-08-20 ENCOUNTER — OFFICE VISIT (OUTPATIENT)
Dept: WOUND CARE | Facility: CLINIC | Age: 85
End: 2025-08-20
Payer: COMMERCIAL

## 2025-08-20 VITALS
SYSTOLIC BLOOD PRESSURE: 161 MMHG | RESPIRATION RATE: 20 BRPM | HEART RATE: 80 BPM | TEMPERATURE: 97.9 F | DIASTOLIC BLOOD PRESSURE: 77 MMHG

## 2025-08-20 DIAGNOSIS — S81.802A TRAUMATIC OPEN WOUND OF LEFT LOWER LEG, INITIAL ENCOUNTER: Primary | ICD-10-CM

## 2025-08-20 DIAGNOSIS — I89.0 LYMPHEDEMA OF BOTH LOWER EXTREMITIES: ICD-10-CM

## 2025-08-20 PROCEDURE — 99212 OFFICE O/P EST SF 10 MIN: CPT | Performed by: ORTHOPAEDIC SURGERY
